# Patient Record
Sex: FEMALE | Race: WHITE | NOT HISPANIC OR LATINO
[De-identification: names, ages, dates, MRNs, and addresses within clinical notes are randomized per-mention and may not be internally consistent; named-entity substitution may affect disease eponyms.]

---

## 2019-03-18 ENCOUNTER — APPOINTMENT (OUTPATIENT)
Dept: ORTHOPEDIC SURGERY | Facility: CLINIC | Age: 54
End: 2019-03-18
Payer: COMMERCIAL

## 2019-03-18 VITALS
HEART RATE: 75 BPM | SYSTOLIC BLOOD PRESSURE: 126 MMHG | HEIGHT: 67 IN | WEIGHT: 123 LBS | BODY MASS INDEX: 19.3 KG/M2 | DIASTOLIC BLOOD PRESSURE: 81 MMHG

## 2019-03-18 DIAGNOSIS — Z78.9 OTHER SPECIFIED HEALTH STATUS: ICD-10-CM

## 2019-03-18 DIAGNOSIS — Z87.09 PERSONAL HISTORY OF OTHER DISEASES OF THE RESPIRATORY SYSTEM: ICD-10-CM

## 2019-03-18 DIAGNOSIS — Z86.79 PERSONAL HISTORY OF OTHER DISEASES OF THE CIRCULATORY SYSTEM: ICD-10-CM

## 2019-03-18 DIAGNOSIS — Z72.3 LACK OF PHYSICAL EXERCISE: ICD-10-CM

## 2019-03-18 PROBLEM — Z00.00 ENCOUNTER FOR PREVENTIVE HEALTH EXAMINATION: Status: ACTIVE | Noted: 2019-03-18

## 2019-03-18 PROCEDURE — 99204 OFFICE O/P NEW MOD 45 MIN: CPT

## 2019-03-18 PROCEDURE — 73630 X-RAY EXAM OF FOOT: CPT | Mod: TC,LT

## 2019-03-18 NOTE — DISCUSSION/SUMMARY
[de-identified] : Assessment: Status post bunion surgery with continued pain over/around the first MTP joint left foot.\par \par Plan:\par She has tried and failed conservative management and I would like to go ahead and get an MRI of the left foot evaluating the whole area. I do believe she might have sesamoiditis as the main culprit of her pain. I also want to evaluate the first metatarsal joint for any sclerosis and arthritis as seen on x-ray. Once the MRI is obtained we will bring her back to me with Dr. Harding to review and discuss treatment plan. For right now she will continue anti-inflammatory treatment and proper shoe wear.

## 2019-03-18 NOTE — HISTORY OF PRESENT ILLNESS
[FreeTextEntry1] : Sarmad is a new patient who is 53 years old and is coming in for a second opinion of her right foot. She had bunion surgery in October of 2018 by a podiatrist at [facility]. She started to have severe pain on the bottom part of her first metatarsal joint in the beginning of February and the pain has not subsided. She is wearing regular close toed shoes today and states that walking hurts. Her pain scale on a very bad day can be a 9/10 today at rest is 3/10. She denies numbness and tingling in the left foot.\par \par Over the past 6 weeks she has continued with physical therapy and a home exercise program. She should just finished a course yesterday of prednisone but prior to that she was on anti-inflammatories which did not help with her pain. She has failed at this point conservative management.

## 2019-03-18 NOTE — PHYSICAL EXAM
[de-identified] : Laterality: Left foot\par \par General: Alert and oriented x3.  In no acute distress.  Pleasant in nature with a normal affect.  No apparent respiratory distress. \par Erythema, Warmth, Rubor: Negative\par Swelling: Negative\par \par ROM Ankle:\par 1. Dorsiflexion: 10 degrees\par 2. Plantarflexion: 40 degrees\par 3. Inversion: 10 degrees\par 4. Eversion: 10 degrees\par \par ROM of digits: Normal\par \par Tenderness to Palpation: \par 1. Lateral Malleolus: Negative\par 2. Medial Malleolus: Negative\par 3. Heel Pain: Negative\par 4. LisFranc Joint Pain: Negative\par 5. First MTP Joint: Negative\par \par Tenderness Metatarsals:\par 1st MT: Negative\par 2nd MT: Negative\par 3rd MT: Negative\par 4th MT: Negative\par 5th MT: Negative\par Base of the 5th MT: Negative\par \par Tendon Pain:\par 1. Posterior Tibialis: Negative\par 2. Peroneus Brevis/Longus: Negative\par \par Ligament Pain:\par 1. ATFL/CFL/PTFL: Negative\par 2. Deltoid Ligaments: Negative\par \par Stability: \par 1. Anterior Drawer: Negative\par 2. Posterior Drawer: Negative\par \par Strength: \par 5/5 TA/GS/EHL/FHL/EDL/ADD/ABD\par \par Pulses: 2+ DP/PT Pulses\par \par Neuro: Intact motor and sensory throughout\par \par Additional Test:\par 1. Romano's Test: Negative\par 2. Tarsal Tunnel Tinel's Sign (Posterior Tibial Nerve Impingement): Negative\par 3. Single Heel Rise: Negative\par 4. severe pain when palpating the tibial sesamoid.\par \par *She also has minimal range of motion at the first MTP joint. Dorsiflexion to 10, flexion to approximately 25-30. [de-identified] : 3 views of the right foot show status post bunion surgery using a chevron technique with the first metatarsal head shortening.  2 screws noticed both proximal and distal to the first MTP joint which seemed to be good length. First metatarsal joint shows hallux rigidus and what seems to be a sclerotic white zone mid first metatarsal joint.

## 2019-03-21 ENCOUNTER — APPOINTMENT (OUTPATIENT)
Dept: ORTHOPEDIC SURGERY | Facility: CLINIC | Age: 54
End: 2019-03-21
Payer: COMMERCIAL

## 2019-03-21 PROCEDURE — 99214 OFFICE O/P EST MOD 30 MIN: CPT

## 2019-03-21 RX ORDER — MELOXICAM 7.5 MG/1
7.5 TABLET ORAL DAILY
Qty: 30 | Refills: 2 | Status: ACTIVE | COMMUNITY
Start: 2019-03-21 | End: 1900-01-01

## 2019-03-21 NOTE — HISTORY OF PRESENT ILLNESS
[FreeTextEntry1] : Patient is a 53 year female present in the office today in regards to her left foot pain. Her current pain level is a 10/10. She states that his pain and swelling remains the same compared to her last office visit. She feels as though her big toe and second toe are being "torn apart" and it has been difficult to weight bear. She presents to the office with results from his recent MRI. She presents to the office wearing sneakers with a metatarsal pad insert. Patient underwent Bunionectomy October 25, 2018, walked normally for 6 weeks and then her pain arose. She has taken prednisone with no improvement. No other complaints at this time.\par \par

## 2019-03-21 NOTE — ADDENDUM
[FreeTextEntry1] : Documented by Wenceslao Hernandez, acting as a scribe for Dr. Harding on this date 03/21/2019 \par \par All medical record entries made by the Scriber were at my, Dr. Harding's direction and personally dictated by me on 03/21/2019. I have reviewed the chart and agree that the record accurately reflects my personal performance of the history, physical exam, assessment and plan. I have also personally directed, reviewed, and agreed with the chart.

## 2019-03-21 NOTE — PHYSICAL EXAM
[de-identified] : General: Alert and oriented x3. In no acute distress. Pleasant in nature with a normal affect. No apparent respiratory distress.\par \par Left Foot exam\par Skin: Clean, dry, intact\par Inspection: No obvious malalignment, no masses, no swelling, no effusion. Hallux valgus. Dorsal medial incision.\par Pulses: 2+ DP/PT pulses\par ROM: FOOT MTP dorsi 40, limited plantar ROM of digits ANKLE 10  degrees of dorsiflexion, 40 degrees of plantarflexion, 10  degrees of subtalar motion.\par Painful ROM: None\par Tenderness: + tenderness fibular sesamoid. + tenderness 1st webspace. No tenderness over the medial/lateral malleolus, no CFL/ATFL/PTFL pain. No medial malleolus pain, no deltoid ligament pain. No heel pain. No Achilles tenderness. No 5th metatarsal pain. No pain to the LisFranc joint. No ttp over the posterior tibial tendon.\par Stability: Negative anterior/posterior drawer.\par Strength: 5/5 ADD/ABD/TA/GS/EHL/FHL/EDL\par Neuro: Sensation in tact to light touch throughout\par Additional tests: Negative Mortons test, negative tarsal tunnel tinels, negative single heel rise\par \par \par   [de-identified] : MRI of left foot performed on 3/18/19 revealed Postoperative changes along the first ray of the left foot. No postoperative complication visualized. Mild stress reaction in the head of the third metatarsal. Mild medial hallux sesamoiditis.

## 2019-03-21 NOTE — DISCUSSION/SUMMARY
[de-identified] : Today in the office I had a lengthy discussion with the patient regarding her left foot pain. I have addressed all of the patient's concern surrounding the pathology of their conditions. I advised the patient to utilize ice and elevate her left foot above the level of their heart. Today, I provided the patient with an in depth interpretation of left foot MRI, and answered all questions.  I have fitted the patient with a short CAM boot today that she should wear for a month. Patient has been prescribe 7.5 mg Meloxicam to be taken with food and no other NSAID. Should there be no improvement, she may take the Mobic BID. The patient can weight bear as tolerated. We discussed the possibility of removing her hardware or sesamoid to alleviate her pain, however would not do any surgery until she is one year post op. The patient should contact the office immediately should they their pain worsen. Follow up with the office in 1 month.  I received and answered all the patient's questions and they verbalized an understanding. The patient is in agreement with this treatment plan. \par \par

## 2019-04-09 ENCOUNTER — APPOINTMENT (OUTPATIENT)
Dept: ORTHOPEDIC SURGERY | Facility: CLINIC | Age: 54
End: 2019-04-09
Payer: COMMERCIAL

## 2019-04-09 DIAGNOSIS — M79.662 PAIN IN LEFT LOWER LEG: ICD-10-CM

## 2019-04-09 PROCEDURE — 99213 OFFICE O/P EST LOW 20 MIN: CPT

## 2019-04-09 NOTE — HISTORY OF PRESENT ILLNESS
[FreeTextEntry1] : Patient is a 53 year female present in the office today in regards to her left foot pain. Her current pain level is a 2/10. She states that her pain has improved 75% and swelling worse compared to her last office visit. Patient complains of swelling in her calf, which is exacerbated as the day continues. She is ambulating with a CAM boot. She is not currently taking any pain medications at this moment. No other complaints at this time.\par \par

## 2019-04-09 NOTE — PHYSICAL EXAM
[de-identified] : General: Alert and oriented x3. In no acute distress. Pleasant in nature with a normal affect. No apparent respiratory distress.\par \par Left Foot exam\par Skin: Clean, dry, intact\par Inspection: No obvious malalignment, no masses, + calf swelling swelling, no effusion. \par Pulses: 2+ DP/PT pulses\par ROM: FOOT full ROM of digits ANKLE 10 degrees of dorsiflexion, 40 degrees of plantarflexion, 10 degrees of subtalar motion.\par Painful ROM: None\par Tenderness: +tenderness over sesamoids. No tenderness over the medial/lateral malleolus, no CFL/ATFL/PTFL pain. No medial malleolus pain, no deltoid ligament pain. No heel pain. No Achilles tenderness. No 5th metatarsal pain. No pain to the LisFranc joint. No ttp over the posterior tibial tendon.\par Stability: Negative anterior/posterior drawer.\par Strength: 5/5 ADD/ABD/TA/GS/EHL/FHL/EDL\par Neuro: Sensation in tact to light touch throughout\par Additional tests: Negative Mortons test, negative tarsal tunnel tinels, negative single heel rise\par \par \par   [de-identified] : No new imaging.

## 2019-04-09 NOTE — DISCUSSION/SUMMARY
[de-identified] : Today in the office I had a lengthy discussion with the patient regarding her left foot pain. I have addressed all of the patient's concern surrounding the pathology of their conditions. I advised the patient to utilize ice, NSAIDs PRN, and elevate her left lower extremity above the level of their heart. In lieu of the swelling that the patient is experiencing in her left calf, at this time I would like the patient to move forward with a Doppler to rule out a blood clot. The patient will follow up with the office in 1 week. In the interim, she should continue to ambulate with the CAM boot. After, she will slowly begin to ween out of the boot and into a stiff soled shoe. The patient should contact the office immediately should they their pain worsen. I received and answered all the patient's questions and they verbalized an understanding. The patient is in agreement with this treatment plan. \par \par

## 2019-04-09 NOTE — ADDENDUM
[FreeTextEntry1] : Documented by Wenceslao Hernandez, acting as a scribe for Dr. Harding on this date 04/09/2019 \par \par All medical record entries made by the Scriber were at my, Dr. Harding's direction and personally dictated by me on 04/09/2019. I have reviewed the chart and agree that the record accurately reflects my personal performance of the history, physical exam, assessment and plan. I have also personally directed, reviewed, and agreed with the chart.

## 2019-04-29 ENCOUNTER — APPOINTMENT (OUTPATIENT)
Dept: ORTHOPEDIC SURGERY | Facility: CLINIC | Age: 54
End: 2019-04-29
Payer: COMMERCIAL

## 2019-04-29 DIAGNOSIS — M84.375D STRESS FRACTURE, LEFT FOOT, SUBSEQUENT ENCOUNTER FOR FRACTURE WITH ROUTINE HEALING: ICD-10-CM

## 2019-04-29 DIAGNOSIS — M84.374A STRESS FRACTURE, RIGHT FOOT, INITIAL ENCOUNTER FOR FRACTURE: ICD-10-CM

## 2019-04-29 PROCEDURE — 99213 OFFICE O/P EST LOW 20 MIN: CPT

## 2019-04-29 NOTE — DISCUSSION/SUMMARY
[de-identified] : Today I had a lengthy discussion with the patient regarding their left foot pain.I have addressed all the patient's concerns surrounding the pathology of their condition. A discussion was had about the possible use of a Romano's plate. The patient was shown the plate in the office today. I advised the patient to utilize ice, NSAIDs PRN, and heat. They can also elevate their left foot above the level of their heart. I would like to see the patient back in the office in 6-8 weeks to reassess their condition. The patient understood and verbally agreed to the treatment plan. All of their questions were answered and they were satisfied with the visit. The patient should call the office if they have any questions or experience worsening symptoms.

## 2019-04-29 NOTE — PHYSICAL EXAM
[de-identified] : General: Alert and oriented x3. In no acute distress. Pleasant in nature with a normal affect. No apparent respiratory distress.\par \par Left Foot exam\par Skin: Clean, dry, intact\par Inspection: No obvious malalignment, no masses, + calf swelling swelling, no effusion. \par Pulses: 2+ DP/PT pulses\par ROM: FOOT full ROM of digits ANKLE 10 degrees of dorsiflexion, 40 degrees of plantarflexion, 10 degrees of subtalar motion.\par Painful ROM: None\par Tenderness: Improved tenderness over sesamoids. No tenderness over the medial/lateral malleolus, no CFL/ATFL/PTFL pain. No medial malleolus pain, no deltoid ligament pain. No heel pain. No Achilles tenderness. No 5th metatarsal pain. No pain to the LisFranc joint. No ttp over the posterior tibial tendon.\par Stability: Negative anterior/posterior drawer.\par Strength: 5/5 ADD/ABD/TA/GS/EHL/FHL/EDL\par Neuro: Sensation in tact to light touch throughout\par Additional tests: Negative Mortons test, negative tarsal tunnel tinels, negative single heel rise\par \par \par   [de-identified] : Doppler Venus of LLE from outside facility on 4/19/2019 and reviewed by me today, 4/29/19, revealed: \par \par Normal study.

## 2019-04-29 NOTE — HISTORY OF PRESENT ILLNESS
[FreeTextEntry1] : 53 year year old female presenting with left foot pain. The patient’s pain is noted to be a 2/10. The patient describes their pain and swelling as the same compared to their last visit. She describes her swelling as improved compared to the last visit. The patient states that she has been non weight bearing. She presents wearing a PO shoe. The patient reports that they are currently not attending physical therapy. She is currently taking no pain medication. No other complaints at this time.

## 2019-04-29 NOTE — ADDENDUM
[FreeTextEntry1] : I, Mo Farr, acted solely as a scribe for Dr. Casey Harding on this date 04/29/2019  .\par  \par All medical record entries made by the Scribe were at my, Dr. Casey Harding, direction and personally dictated by me on 04/29/2019 . I have reviewed the chart and agree that the record accurately reflects my personal performance of the history, physical exam, assessment and plan. I have also personally directed, reviewed, and agreed with the chart.\par \par \par

## 2019-05-10 ENCOUNTER — APPOINTMENT (OUTPATIENT)
Dept: ORTHOPEDIC SURGERY | Facility: CLINIC | Age: 54
End: 2019-05-10
Payer: COMMERCIAL

## 2019-05-10 PROCEDURE — 99214 OFFICE O/P EST MOD 30 MIN: CPT

## 2019-05-10 PROCEDURE — 73630 X-RAY EXAM OF FOOT: CPT | Mod: LT

## 2019-05-10 NOTE — HISTORY OF PRESENT ILLNESS
[FreeTextEntry1] : 53 year year old female presenting with left foot pain. The patient’s pain is noted to be a 6/10. The patient describes their pain as the same compared to their last visit. She describes her swelling as improved compared to the last visit. The patient states that she recently experienced an increase in pain that was described as sharp in nature. The patient states that her pain occurs between the toes while walking and near the site of her surgical hardware. She is currently taking no pain medication. The patient reports that they are currently not attending physical therapy. She presents wearing regular shoes. No other complaints at this time.

## 2019-05-10 NOTE — PHYSICAL EXAM
[de-identified] : General: Alert and oriented x3. In no acute distress. Pleasant in nature with a normal affect. No apparent respiratory distress.\par \par Left Foot exam\par Skin: Clean, dry, intact\par Inspection: No obvious malalignment, no masses, improved calf swelling swelling, no effusion. \par Pulses: 2+ DP/PT pulses\par ROM: FOOT full ROM of digits ANKLE 10 degrees of dorsiflexion, 40 degrees of plantarflexion, 10 degrees of subtalar motion.\par Painful ROM: None\par Tenderness: Improved tenderness over sesamoids. No tenderness over the medial/lateral malleolus, no CFL/ATFL/PTFL pain. No medial malleolus pain, no deltoid ligament pain. No heel pain. No Achilles tenderness. No 5th metatarsal pain. No pain to the LisFranc joint. No ttp over the posterior tibial tendon.\par Stability: Negative anterior/posterior drawer.\par Strength: 5/5 ADD/ABD/TA/GS/EHL/FHL/EDL\par Neuro: Sensation in tact to light touch throughout\par Additional tests: Negative Mortons test, negative tarsal tunnel tinels, negative single heel rise\par \par \par   [de-identified] : 3V of the L foot were ordered obtained and reviewed by me today, 05/10/2019 , revealed: Hardware in place. \par \par \par \par

## 2019-05-10 NOTE — DISCUSSION/SUMMARY
[de-identified] : Today I had a lengthy discussion with the patient regarding their left foot pain.I have addressed all the patient's concerns surrounding the pathology of their condition.  A discussion was had about surgery. A lengthy discussion was had about hardware removal surgery for the left foot . All risks, benefits and alternatives to the recommended surgical procedure were discussed which include but are not limited to bleeding, infection, nerve damage, vascular damage, failure of the wound to heal, the need for further surgery, loss of limb, DVT, PE, loss of life as well as the risks associated with general anesthesia. The patient verbalized understanding and provided informed consent to move forward with surgery. I advised the patient to obtain the OP report for the original surgery prior to the surgery. I informed the patient that she would have to utilize a stiff shoe following the surgery. The patient understood and verbally agreed to the treatment plan. All of their questions were answered and they were satisfied with the visit. The patient should call the office if they have any questions or experience worsening symptoms.

## 2019-05-10 NOTE — ADDENDUM
[FreeTextEntry1] : I, Mo Farr, acted solely as a scribe for Dr. Casey Harding on this date 05/10/2019  .\par  \par All medical record entries made by the Scribe were at my, Dr. Casey Harding, direction and personally dictated by me on 05/10/2019 . I have reviewed the chart and agree that the record accurately reflects my personal performance of the history, physical exam, assessment and plan. I have also personally directed, reviewed, and agreed with the chart.\par \par \par

## 2019-05-17 ENCOUNTER — RX RENEWAL (OUTPATIENT)
Age: 54
End: 2019-05-17

## 2019-05-25 ENCOUNTER — TRANSCRIPTION ENCOUNTER (OUTPATIENT)
Age: 54
End: 2019-05-25

## 2019-05-29 ENCOUNTER — APPOINTMENT (OUTPATIENT)
Dept: ORTHOPEDIC SURGERY | Facility: HOSPITAL | Age: 54
End: 2019-05-29

## 2019-05-29 ENCOUNTER — RESULT REVIEW (OUTPATIENT)
Age: 54
End: 2019-05-29

## 2019-05-29 ENCOUNTER — OUTPATIENT (OUTPATIENT)
Dept: OUTPATIENT SERVICES | Facility: HOSPITAL | Age: 54
LOS: 1 days | Discharge: HOME | End: 2019-05-29
Payer: COMMERCIAL

## 2019-05-29 VITALS
DIASTOLIC BLOOD PRESSURE: 79 MMHG | RESPIRATION RATE: 16 BRPM | OXYGEN SATURATION: 99 % | TEMPERATURE: 98 F | HEART RATE: 83 BPM | SYSTOLIC BLOOD PRESSURE: 126 MMHG

## 2019-05-29 VITALS
DIASTOLIC BLOOD PRESSURE: 76 MMHG | OXYGEN SATURATION: 99 % | RESPIRATION RATE: 16 BRPM | HEART RATE: 93 BPM | SYSTOLIC BLOOD PRESSURE: 113 MMHG | TEMPERATURE: 98 F | WEIGHT: 125 LBS | HEIGHT: 67 IN

## 2019-05-29 DIAGNOSIS — Z98.890 OTHER SPECIFIED POSTPROCEDURAL STATES: Chronic | ICD-10-CM

## 2019-05-29 LAB
HCG UR QL: NEGATIVE — SIGNIFICANT CHANGE UP
SURGICAL PATHOLOGY STUDY: SIGNIFICANT CHANGE UP

## 2019-05-29 PROCEDURE — 88300 SURGICAL PATH GROSS: CPT | Mod: 26

## 2019-05-29 PROCEDURE — 20680 REMOVAL OF IMPLANT DEEP: CPT | Mod: LT

## 2019-05-29 RX ORDER — FAMOTIDINE 10 MG/ML
20 INJECTION INTRAVENOUS ONCE
Refills: 0 | Status: COMPLETED | OUTPATIENT
Start: 2019-05-29 | End: 2019-05-29

## 2019-05-29 RX ORDER — ROSUVASTATIN CALCIUM 5 MG/1
1 TABLET ORAL
Qty: 0 | Refills: 0 | DISCHARGE

## 2019-05-29 RX ORDER — OXYCODONE HYDROCHLORIDE 5 MG/1
1 TABLET ORAL
Qty: 5 | Refills: 0
Start: 2019-05-29

## 2019-05-29 RX ORDER — LORATADINE 10 MG/1
1 TABLET ORAL
Qty: 0 | Refills: 0 | DISCHARGE

## 2019-05-29 RX ORDER — ACETAMINOPHEN 500 MG
975 TABLET ORAL ONCE
Refills: 0 | Status: COMPLETED | OUTPATIENT
Start: 2019-05-29 | End: 2019-05-29

## 2019-05-29 RX ORDER — FENTANYL CITRATE 50 UG/ML
50 INJECTION INTRAVENOUS
Refills: 0 | Status: DISCONTINUED | OUTPATIENT
Start: 2019-05-29 | End: 2019-05-29

## 2019-05-29 RX ORDER — ONDANSETRON 8 MG/1
4 TABLET, FILM COATED ORAL ONCE
Refills: 0 | Status: DISCONTINUED | OUTPATIENT
Start: 2019-05-29 | End: 2019-05-29

## 2019-05-29 RX ORDER — SODIUM CHLORIDE 9 MG/ML
3 INJECTION INTRAMUSCULAR; INTRAVENOUS; SUBCUTANEOUS EVERY 8 HOURS
Refills: 0 | Status: DISCONTINUED | OUTPATIENT
Start: 2019-05-29 | End: 2019-05-29

## 2019-05-29 RX ORDER — DOCUSATE SODIUM 100 MG
1 CAPSULE ORAL
Qty: 20 | Refills: 0
Start: 2019-05-29

## 2019-05-29 RX ORDER — ASPIRIN/CALCIUM CARB/MAGNESIUM 324 MG
1 TABLET ORAL
Qty: 14 | Refills: 0
Start: 2019-05-29 | End: 2019-06-11

## 2019-05-29 RX ORDER — OXYCODONE HYDROCHLORIDE 5 MG/1
5 TABLET ORAL ONCE
Refills: 0 | Status: DISCONTINUED | OUTPATIENT
Start: 2019-05-29 | End: 2019-05-29

## 2019-05-29 RX ORDER — SODIUM CHLORIDE 9 MG/ML
1000 INJECTION INTRAMUSCULAR; INTRAVENOUS; SUBCUTANEOUS
Refills: 0 | Status: DISCONTINUED | OUTPATIENT
Start: 2019-05-29 | End: 2019-05-29

## 2019-05-29 RX ORDER — OXYCODONE HYDROCHLORIDE 5 MG/1
10 TABLET ORAL ONCE
Refills: 0 | Status: DISCONTINUED | OUTPATIENT
Start: 2019-05-29 | End: 2019-05-29

## 2019-05-29 RX ORDER — ONDANSETRON 8 MG/1
1 TABLET, FILM COATED ORAL
Qty: 20 | Refills: 0
Start: 2019-05-29

## 2019-05-29 RX ORDER — OLMESARTAN MEDOXOMIL 5 MG/1
1 TABLET, FILM COATED ORAL
Qty: 0 | Refills: 0 | DISCHARGE

## 2019-05-29 RX ADMIN — SODIUM CHLORIDE 100 MILLILITER(S): 9 INJECTION INTRAMUSCULAR; INTRAVENOUS; SUBCUTANEOUS at 10:30

## 2019-05-29 RX ADMIN — OXYCODONE HYDROCHLORIDE 10 MILLIGRAM(S): 5 TABLET ORAL at 06:43

## 2019-05-29 RX ADMIN — Medication 975 MILLIGRAM(S): at 06:44

## 2019-05-29 RX ADMIN — FAMOTIDINE 20 MILLIGRAM(S): 10 INJECTION INTRAVENOUS at 06:43

## 2019-05-29 RX ADMIN — OXYCODONE HYDROCHLORIDE 10 MILLIGRAM(S): 5 TABLET ORAL at 06:44

## 2019-05-29 NOTE — ASU DISCHARGE PLAN (ADULT/PEDIATRIC) - CARE PROVIDER_API CALL
Casey Harding (DO)  Orthopaedic Surgery  [address] [place] [address]  Phone: [phone]  Fax: [phone]  Follow Up Time:

## 2019-06-02 DIAGNOSIS — T84.84XA PAIN DUE TO INTERNAL ORTHOPEDIC PROSTHETIC DEVICES, IMPLANTS AND GRAFTS, INITIAL ENCOUNTER: ICD-10-CM
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
## 2019-06-02 DIAGNOSIS — I10 ESSENTIAL (PRIMARY) HYPERTENSION: ICD-10-CM

## 2019-06-02 DIAGNOSIS — E78.5 HYPERLIPIDEMIA, UNSPECIFIED: ICD-10-CM

## 2019-06-02 DIAGNOSIS — J45.909 UNSPECIFIED ASTHMA, UNCOMPLICATED: ICD-10-CM

## 2019-06-05 ENCOUNTER — APPOINTMENT (OUTPATIENT)
Dept: ORTHOPEDIC SURGERY | Facility: CLINIC | Age: 54
End: 2019-06-05
Payer: COMMERCIAL

## 2019-06-05 PROBLEM — E78.00 PURE HYPERCHOLESTEROLEMIA, UNSPECIFIED: Chronic | Status: ACTIVE | Noted: 2019-05-29

## 2019-06-05 PROBLEM — I10 ESSENTIAL (PRIMARY) HYPERTENSION: Chronic | Status: ACTIVE | Noted: 2019-05-29

## 2019-06-05 PROCEDURE — 73630 X-RAY EXAM OF FOOT: CPT | Mod: LT

## 2019-06-05 PROCEDURE — 99024 POSTOP FOLLOW-UP VISIT: CPT
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
## 2019-06-05 NOTE — HISTORY OF PRESENT ILLNESS
[FreeTextEntry1] : Pt is a 53 year old  F present in the office today in regards to her L foot pain. Her current pain level is a 1/10. Pt reports she is s/p 1 week PO L foot sx from outside facility. Pt states she has improved in pain and swelling as compared to their last visit. Pt is present at the appointment wearing a PO shoe. She is not currently taking any pain medications at this moment. No other complaints at this time.

## 2019-06-05 NOTE — ADDENDUM
[FreeTextEntry1] : Documented by Sharri Pastor acting as a scribe for Dr. Harding on 06/05/2019 \par \par All medical record entries made by the Scribe were at my, Dr. Mendenhall, direction and\par personally dictated by me on 06/05/2019 . I have reviewed the chart and agree that the record\par accurately reflects my personal performance of the history, physical exam, procedure and imaging.

## 2019-06-05 NOTE — DISCUSSION/SUMMARY
[de-identified] : Today in the office I had a lengthy discussion with the patient regarding her left foot pain. I have addressed all of the patient's concern surrounding the pathology of their conditions. Pt is doing well PO. She is to be out of work until 6/10/19. The pt is to call me as soon as possible if they notice any worsening pain or symptoms. I would like to follow up with the patient in 1 week for suture removal. All questions were answered and the patient verbalized understanding. The patient is in agreement with this treatment plan. \par \par Pt requesting implants removed.

## 2019-06-12 ENCOUNTER — APPOINTMENT (OUTPATIENT)
Dept: ORTHOPEDIC SURGERY | Facility: CLINIC | Age: 54
End: 2019-06-12
Payer: COMMERCIAL

## 2019-06-12 PROCEDURE — 99024 POSTOP FOLLOW-UP VISIT: CPT

## 2019-06-12 NOTE — HISTORY OF PRESENT ILLNESS
[___ Weeks Post Op] : [unfilled] weeks post op [2] : the patient reports pain that is 2/10 in severity [Excellent Pain Control] : has excellent pain control [Doing Well] : is doing well [Sutures Removed] : sutures were removed [No Sign of Infection] : is showing no signs of infection [Steri-Strips Removed & Replaced] : steri-strips removed and replaced [Nausea] : no nausea [Chills] : no chills [Fever] : no fever [Vomiting] : no vomiting [de-identified] : Removal of hardware left foot. DOS 5/29/19 [de-identified] : None new obtained today.  [de-identified] : 53 year old female present in the office s/p Removal of hardware left foot. DOS 5/29/19. Her current pain level is a 2/10. Pt states their pain and swelling is unchanged as compared to the previous visit. She is not currently taking any pain medications at this moment. Pt is present at the appointment wearing a PO shoe. No other complaints at this time. \par  \par  [de-identified] :  General: Alert and oriented x3. In no acute distress. Pleasant in nature with a normal affect. No apparent respiratory distress.\par \par L foot Exam \par The wound is intact. Sutures removed and steri strips applied. Pain improved. No evidence of any diastases or dehiscence. No surrounding erythema noted. No fluctuance. The area is warm and well-perfused. The patient is able to wiggle her toes. Neurovascularly intact. No lymphadenopathy. \par  [de-identified] : 53 year old female present in the office s/p Removal of hardware left foot. DOS 5/29/19. I have addressed all of the patient's concern surrounding the pathology of their conditions. I have removed all the patient's sutures today and had applied steri strips in the office. Pt tolerated the procedure well. I also suggested to the pt that they keep the wound dry and clean. Pt is allowed to transition out of the PO shoe and into a regular sneaker. The pt is to call me as soon as possible if they notice any worsening pain or symptoms. I would like to follow up with the patient within the next 8 weeks. All questions were answered and the patient verbalized understanding. The patient is in agreement with this treatment plan. \par  \par

## 2019-06-12 NOTE — HISTORY OF PRESENT ILLNESS
[___ Weeks Post Op] : [unfilled] weeks post op [2] : the patient reports pain that is 2/10 in severity [Doing Well] : is doing well [Excellent Pain Control] : has excellent pain control [Sutures Removed] : sutures were removed [No Sign of Infection] : is showing no signs of infection [Steri-Strips Removed & Replaced] : steri-strips removed and replaced [Chills] : no chills [Nausea] : no nausea [Fever] : no fever [de-identified] : Removal of hardware left foot. DOS 5/29/19 [Vomiting] : no vomiting [de-identified] :  General: Alert and oriented x3. In no acute distress. Pleasant in nature with a normal affect. No apparent respiratory distress.\par \par L foot Exam \par The wound is intact. Sutures removed and steri strips applied. Pain improved. No evidence of any diastases or dehiscence. No surrounding erythema noted. No fluctuance. The area is warm and well-perfused. The patient is able to wiggle her toes. Neurovascularly intact. No lymphadenopathy. \par  [de-identified] : 53 year old female present in the office s/p Removal of hardware left foot. DOS 5/29/19. Her current pain level is a 2/10. Pt states their pain and swelling is unchanged as compared to the previous visit. She is not currently taking any pain medications at this moment. Pt is present at the appointment wearing a PO shoe. No other complaints at this time. \par  \par  [de-identified] : None new obtained today.  [de-identified] : 53 year old female present in the office s/p Removal of hardware left foot. DOS 5/29/19. I have addressed all of the patient's concern surrounding the pathology of their conditions. I have removed all the patient's sutures today and had applied steri strips in the office. Pt tolerated the procedure well. I also suggested to the pt that they keep the wound dry and clean. Pt is allowed to transition out of the PO shoe and into a regular sneaker. The pt is to call me as soon as possible if they notice any worsening pain or symptoms. I would like to follow up with the patient within the next 8 weeks. All questions were answered and the patient verbalized understanding. The patient is in agreement with this treatment plan. \par  \par

## 2019-06-12 NOTE — ADDENDUM
[FreeTextEntry1] : Documented by Sharri Pastor acting as a scribe for Dr. Harding on 06/12/2019 \par \par All medical record entries made by the Scribe were at my, Dr. Mendenhall, direction and\par personally dictated by me on 06/12/2019 . I have reviewed the chart and agree that the record\par accurately reflects my personal performance of the history, physical exam, procedure and imaging.

## 2019-07-08 ENCOUNTER — RX RENEWAL (OUTPATIENT)
Age: 54
End: 2019-07-08

## 2019-07-08 RX ORDER — CHOLECALCIFEROL (VITAMIN D3) 1250 MCG
1.25 MG CAPSULE ORAL
Qty: 6 | Refills: 1 | Status: ACTIVE | COMMUNITY
Start: 2019-04-09 | End: 1900-01-01

## 2019-07-21 ENCOUNTER — TRANSCRIPTION ENCOUNTER (OUTPATIENT)
Age: 54
End: 2019-07-21

## 2019-08-13 ENCOUNTER — APPOINTMENT (OUTPATIENT)
Dept: ORTHOPEDIC SURGERY | Facility: CLINIC | Age: 54
End: 2019-08-13

## 2019-08-22 ENCOUNTER — APPOINTMENT (OUTPATIENT)
Dept: ORTHOPEDIC SURGERY | Facility: CLINIC | Age: 54
End: 2019-08-22
Payer: COMMERCIAL

## 2019-08-22 DIAGNOSIS — M25.80 OTHER SPECIFIED JOINT DISORDERS, UNSPECIFIED JOINT: ICD-10-CM

## 2019-08-22 DIAGNOSIS — T84.9XXA UNSPECIFIED COMPLICATION OF INTERNAL ORTHOPEDIC PROSTHETIC DEVICE, IMPLANT AND GRAFT, INITIAL ENCOUNTER: ICD-10-CM

## 2019-08-22 DIAGNOSIS — M79.672 PAIN IN LEFT FOOT: ICD-10-CM

## 2019-08-22 PROCEDURE — 99024 POSTOP FOLLOW-UP VISIT: CPT

## 2019-08-22 NOTE — ADDENDUM
[FreeTextEntry1] : Documented by Yamilka Salmeron acting solely as a scribe for Dr. Casey Harding on this date 08/22/2019 .\par \par All medical record entries made by the Scribe were at my, Dr. Casey Harding, direction and personally dictated by me on 08/22/2019 . I have reviewed the chart and agree that the record accurately reflects my personal performance of the history, physical exam, assessment and plan. I have also personally directed, reviewed, and agreed with the chart.\par \par \par

## 2019-08-22 NOTE — HISTORY OF PRESENT ILLNESS
[___ Months Post Op] : [unfilled] months post op [0] : no pain reported [Doing Well] : is doing well [Excellent Pain Control] : has excellent pain control [No Sign of Infection] : is showing no signs of infection [Sutures Removed] : sutures were removed [Steri-Strips Removed & Replaced] : steri-strips removed and replaced [Healed] : healed [Chills] : no chills [Fever] : no fever [Nausea] : no nausea [Vomiting] : no vomiting [Discharge] : absent of discharge [Erythema] : not erythematous [Dehiscence] : not dehisced [de-identified] : s/p removal of hardware left foot. DOS 5/29/19 [de-identified] : 53 year old female present in the office s/p Removal of hardware left foot. DOS 5/29/19. Her current pain level is a 0/10. Pt states their pain is 100% improved and their swelling is 50% improved as compared to the previous visit. She is not currently taking any pain medications at this moment. She is currently not in physical therapy. Pt is present at the appointment wearing a regular shoes. No other complaints at this time. \par  \par  [de-identified] : General: Alert and oriented x3. In no acute distress. Pleasant in nature with a normal affect. No apparent respiratory distress.\par The wound is intact. no evidence of any diastases or dehiscence. No surrounding erythema noted. No fluctuance. The area is warm and well perfused. The patient is able to wiggle their toes. Neurovascularly intact. She has complete resolution. She has dorsiflexion to 40° and limited planar flexion. Hallux valgus interphalangeus  \par  [de-identified] : No new imaging reviewed today. \par \par   [de-identified] : 53 year old female present in the office s/p removal of hardware left foot. DOS 5/29/19. She has complete resolution of her symptoms. Patient is a 53 year old female present in the office today 3 months s/p removal of hardware left foot.  The patient understood and verbally agreed to the treatment plan. All of their questions were answered and they were satisfied with the visit. The patient should call the office if they have any question or experience worsening symptoms.

## 2020-11-19 ENCOUNTER — TRANSCRIPTION ENCOUNTER (OUTPATIENT)
Age: 55
End: 2020-11-19

## 2021-01-02 ENCOUNTER — TRANSCRIPTION ENCOUNTER (OUTPATIENT)
Age: 56
End: 2021-01-02

## 2021-01-24 ENCOUNTER — TRANSCRIPTION ENCOUNTER (OUTPATIENT)
Age: 56
End: 2021-01-24

## 2022-01-02 ENCOUNTER — TRANSCRIPTION ENCOUNTER (OUTPATIENT)
Age: 57
End: 2022-01-02

## 2022-05-22 ENCOUNTER — NON-APPOINTMENT (OUTPATIENT)
Age: 57
End: 2022-05-22

## 2023-02-23 ENCOUNTER — OFFICE (OUTPATIENT)
Dept: URBAN - METROPOLITAN AREA CLINIC 102 | Facility: CLINIC | Age: 58
Setting detail: OPHTHALMOLOGY
End: 2023-02-23
Payer: COMMERCIAL

## 2023-02-23 DIAGNOSIS — H40.033: ICD-10-CM

## 2023-02-23 DIAGNOSIS — G51.4: ICD-10-CM

## 2023-02-23 DIAGNOSIS — D31.31: ICD-10-CM

## 2023-02-23 PROCEDURE — 92014 COMPRE OPH EXAM EST PT 1/>: CPT | Performed by: OPHTHALMOLOGY

## 2023-02-23 PROCEDURE — 92020 GONIOSCOPY: CPT | Performed by: OPHTHALMOLOGY

## 2023-02-23 ASSESSMENT — REFRACTION_MANIFEST
OD_CYLINDER: -0.50
OS_SPHERE: +2.00
OD_CYLINDER: -0.50
OS_VA1: 20/20
OS_SPHERE: +1.25
OS_ADD: +2.00
OD_ADD: +2.00
OD_SPHERE: +2.00
OD_ADD: +2.25
OD_VA1: 20/20
OS_CYLINDER: -0.25
OD_AXIS: 160
OD_AXIS: 145
OS_VA1: 20/20
OS_AXIS: 176
OD_AXIS: 148
OD_VA1: 20/20
OS_CYLINDER: SPHERE
OS_ADD: +2.25
OD_SPHERE: +1.25
OS_CYLINDER: SPHERE
OS_SPHERE: +1.25
OD_SPHERE: +1.50
OD_CYLINDER: -0.50

## 2023-02-23 ASSESSMENT — REFRACTION_CURRENTRX
OS_CYLINDER: SPHERE
OD_ADD: +1.75
OS_SPHERE: +0.75
OS_ADD: +1.75
OD_OVR_VA: 20/
OD_AXIS: 147
OD_SPHERE: +1.25
OD_CYLINDER: -0.50
OS_OVR_VA: 20/

## 2023-02-23 ASSESSMENT — KERATOMETRY
OD_K1POWER_DIOPTERS: 45.25
OS_AXISANGLE_DEGREES: 81
OS_K1POWER_DIOPTERS: 45.25
OD_K2POWER_DIOPTERS: 45.75
OS_K2POWER_DIOPTERS: 45.75
OD_AXISANGLE_DEGREES: 89

## 2023-02-23 ASSESSMENT — REFRACTION_AUTOREFRACTION
OS_AXIS: 45
OS_SPHERE: +2.00
OD_SPHERE: +2.25
OD_AXIS: 143
OD_CYLINDER: -0.50
OS_CYLINDER: -0.25

## 2023-02-23 ASSESSMENT — AXIALLENGTH_DERIVED
OS_AL: 22.2124
OS_AL: 22.2124
OD_AL: 22.1694
OD_AL: 22.4304
OD_AL: 22.2557
OD_AL: 22.5188

## 2023-02-23 ASSESSMENT — CONFRONTATIONAL VISUAL FIELD TEST (CVF)
OD_FINDINGS: FULL
OS_FINDINGS: FULL

## 2023-02-23 ASSESSMENT — SPHEQUIV_DERIVED
OS_SPHEQUIV: 1.875
OS_SPHEQUIV: 1.875
OD_SPHEQUIV: 1.75
OD_SPHEQUIV: 1.25
OD_SPHEQUIV: 2
OD_SPHEQUIV: 1

## 2023-02-23 ASSESSMENT — VISUAL ACUITY
OS_BCVA: 20/20
OD_BCVA: 20/20

## 2023-04-19 ENCOUNTER — NON-APPOINTMENT (OUTPATIENT)
Age: 58
End: 2023-04-19

## 2023-05-02 ENCOUNTER — NON-APPOINTMENT (OUTPATIENT)
Age: 58
End: 2023-05-02

## 2023-10-10 ENCOUNTER — APPOINTMENT (OUTPATIENT)
Dept: ORTHOPEDIC SURGERY | Facility: CLINIC | Age: 58
End: 2023-10-10
Payer: COMMERCIAL

## 2023-10-10 DIAGNOSIS — Z86.39 PERSONAL HISTORY OF OTHER ENDOCRINE, NUTRITIONAL AND METABOLIC DISEASE: ICD-10-CM

## 2023-10-10 DIAGNOSIS — Z80.9 FAMILY HISTORY OF MALIGNANT NEOPLASM, UNSPECIFIED: ICD-10-CM

## 2023-10-10 DIAGNOSIS — M79.645 PAIN IN LEFT FINGER(S): ICD-10-CM

## 2023-10-10 DIAGNOSIS — Z83.3 FAMILY HISTORY OF DIABETES MELLITUS: ICD-10-CM

## 2023-10-10 PROCEDURE — 20600 DRAIN/INJ JOINT/BURSA W/O US: CPT | Mod: LT

## 2023-10-10 PROCEDURE — 73130 X-RAY EXAM OF HAND: CPT | Mod: 50

## 2023-10-10 PROCEDURE — 99204 OFFICE O/P NEW MOD 45 MIN: CPT | Mod: 25

## 2023-10-10 RX ORDER — ROSUVASTATIN CALCIUM 5 MG/1
TABLET, FILM COATED ORAL
Refills: 0 | Status: ACTIVE | COMMUNITY

## 2023-10-10 RX ORDER — OLMESARTAN MEDOXOMIL-HYDROCHLOROTHIAZIDE 12.5; 4 MG/1; MG/1
40-12.5 TABLET, FILM COATED ORAL
Refills: 0 | Status: ACTIVE | COMMUNITY

## 2023-12-13 ENCOUNTER — NON-APPOINTMENT (OUTPATIENT)
Age: 58
End: 2023-12-13

## 2023-12-26 ENCOUNTER — NON-APPOINTMENT (OUTPATIENT)
Age: 58
End: 2023-12-26

## 2024-01-03 ENCOUNTER — APPOINTMENT (OUTPATIENT)
Dept: ORTHOPEDIC SURGERY | Facility: CLINIC | Age: 59
End: 2024-01-03
Payer: COMMERCIAL

## 2024-01-03 DIAGNOSIS — M19.049 PRIMARY OSTEOARTHRITIS, UNSPECIFIED HAND: ICD-10-CM

## 2024-01-03 PROCEDURE — 99214 OFFICE O/P EST MOD 30 MIN: CPT | Mod: 25

## 2024-01-03 PROCEDURE — 20600 DRAIN/INJ JOINT/BURSA W/O US: CPT | Mod: F2

## 2024-01-03 NOTE — ASSESSMENT
[FreeTextEntry1] : ASSESSMENT: The patient comes in today with chronic exacerbated symptoms of multiple small joint arthritis end-stage which is bothering her tremendously.  Injections have been beneficial however she is considering neck steps.  Today she would like repeat injection.  Surgically she would not like a fusion.   The patient was adequately and thoroughly informed of my assessment of their current condition(s).  - This may diminish bodily function for the extremity. We discussed prognosis, tx modalities including operative and nonoperative options for the above diagnostic assessment. As always, 2nd opinion is always provided as an option.  When accessible, I was able to review other physicians note(s) including reviewing other tests, imaging results as well as personally view these results for my own interpretation.   Injection:  The risks and benefits of a steroid injection were discussed in detail. The risks include but are not limited to: pain, infection, swelling, flare response, bleeding, subcutaneous fat atrophy, skin depigmentation and/or elevation of blood sugar. The risk of incomplete resolution of symptoms, recurrence and additional intervention was reviewed and considered by the patient.  The patient agreed to proceed and under a sterile prep, I injected 1 unit into 1 cc of a combination of Celestone and Lidocaine into the left middle DIP. The patient tolerated the injection well. The patient was adequately and thoroughly informed of my assessment of their current condition(s).  DISCUSSION: 1.  Injection as above.  Follow-up 2 months.  2. [x] 3. [x]

## 2024-01-03 NOTE — HISTORY OF PRESENT ILLNESS
[FreeTextEntry1] : Sarmad is a very pleasant 58-year-old female who presents today with severe left middle finger DIP joint discomfort and swelling.  This has been ongoing for years now and recently getting worse with ADLs

## 2024-01-03 NOTE — PHYSICAL EXAM
[de-identified] : Examination of bilateral hands reveals DIP joint deformity in multiple fingers most pronounced in left middle.  There is significant loss of motion and grinding and crepitus with passive motion [de-identified] : [4] views of [bilateral hands and wrists] were reviewed today in my office and were seen by me and discussed with the patient.  These [show findings consistent with bilateral basal joint OA and findings of IP joint OA]

## 2024-02-13 ENCOUNTER — NON-APPOINTMENT (OUTPATIENT)
Age: 59
End: 2024-02-13

## 2024-04-19 ENCOUNTER — APPOINTMENT (OUTPATIENT)
Dept: ORTHOPEDIC SURGERY | Facility: CLINIC | Age: 59
End: 2024-04-19
Payer: COMMERCIAL

## 2024-04-19 VITALS — WEIGHT: 123 LBS | BODY MASS INDEX: 19.3 KG/M2 | HEIGHT: 67 IN

## 2024-04-19 DIAGNOSIS — M23.90 UNSPECIFIED INTERNAL DERANGEMENT OF UNSPECIFIED KNEE: ICD-10-CM

## 2024-04-19 PROCEDURE — 99203 OFFICE O/P NEW LOW 30 MIN: CPT

## 2024-04-19 PROCEDURE — 73562 X-RAY EXAM OF KNEE 3: CPT | Mod: LT

## 2024-04-19 RX ORDER — DICLOFENAC POTASSIUM 50 MG/1
50 TABLET, COATED ORAL TWICE DAILY
Qty: 14 | Refills: 0 | Status: ACTIVE | COMMUNITY
Start: 2024-04-19 | End: 1900-01-01

## 2024-04-19 RX ORDER — LOSARTAN POTASSIUM 100 MG/1
TABLET, FILM COATED ORAL
Refills: 0 | Status: COMPLETED | COMMUNITY
End: 2024-04-19

## 2024-04-23 ENCOUNTER — APPOINTMENT (OUTPATIENT)
Dept: MRI IMAGING | Facility: CLINIC | Age: 59
End: 2024-04-23
Payer: COMMERCIAL

## 2024-04-23 PROCEDURE — 73721 MRI JNT OF LWR EXTRE W/O DYE: CPT | Mod: LT

## 2024-04-26 ENCOUNTER — APPOINTMENT (OUTPATIENT)
Dept: ORTHOPEDIC SURGERY | Facility: CLINIC | Age: 59
End: 2024-04-26
Payer: COMMERCIAL

## 2024-04-26 VITALS — HEIGHT: 67 IN | WEIGHT: 123 LBS | BODY MASS INDEX: 19.3 KG/M2

## 2024-04-26 PROCEDURE — 99213 OFFICE O/P EST LOW 20 MIN: CPT

## 2024-04-26 NOTE — ASSESSMENT
[FreeTextEntry1] : Patient is stable the anti-inflammatory medicine attempted physical therapy to decrease the swelling increase her quad strength and normalize her gait she will return in 3 to 4 weeks we might consider an arthroscopic procedure to help improve the patellofemoral symptoms.

## 2024-04-26 NOTE — HISTORY OF PRESENT ILLNESS
[Gradual] : gradual [4] : 4 [0] : 0 [Dull/Aching] : dull/aching [Intermittent] : intermittent [Leisure] : leisure [Rest] : rest [Exercising] : exercising [Full time] : Work status: full time [] : no [FreeTextEntry1] : L Knee  [de-identified] : 4/19/2024 [de-identified] : Dr. Melton [de-identified] : MRI [de-identified] :  [employer]

## 2024-04-26 NOTE — REASON FOR VISIT
[FreeTextEntry2] : MRI review L Knee / Patient returns to the office in follow-up regarding her left knee she is improving slowly and her recent MRI scan was significant for some patella subluxation with some degenerative changes on the posterior aspect the patella he does not have any significant tears of the ligaments or meniscus some crepitus on range of motion but no instability.

## 2024-05-17 ENCOUNTER — APPOINTMENT (OUTPATIENT)
Dept: ORTHOPEDIC SURGERY | Facility: CLINIC | Age: 59
End: 2024-05-17

## 2024-05-28 ENCOUNTER — APPOINTMENT (OUTPATIENT)
Dept: ORTHOPEDIC SURGERY | Facility: CLINIC | Age: 59
End: 2024-05-28
Payer: COMMERCIAL

## 2024-05-28 VITALS — WEIGHT: 123 LBS | HEIGHT: 67 IN | BODY MASS INDEX: 19.3 KG/M2

## 2024-05-28 PROCEDURE — 99213 OFFICE O/P EST LOW 20 MIN: CPT

## 2024-05-28 NOTE — HISTORY OF PRESENT ILLNESS
[Gradual] : gradual [4] : 4 [0] : 0 [Dull/Aching] : dull/aching [Intermittent] : intermittent [Leisure] : leisure [Rest] : rest [Exercising] : exercising [Full time] : Work status: full time [] : Post Surgical Visit: no [FreeTextEntry1] : L Knee  [de-identified] : 4/26/24 [de-identified] : Dr. Melton [de-identified] : MRI [de-identified] :  [employer]

## 2024-05-28 NOTE — ASSESSMENT
[FreeTextEntry1] : MRI results discussed with her. Would recommend continue with PT. May try elastic knee brace. Discussed possible HA injections; booklet given.

## 2024-05-28 NOTE — REASON FOR VISIT
[FreeTextEntry2] : Patient seen Dr. Melton previously had MRI.  Was in terrible pain left knee yesterday and used knee brace. She feels better today. Started PT.

## 2024-06-09 NOTE — PHYSICAL EXAM
[Left] : left knee [NL (0)] : extension 0 degrees [5___] : quadriceps 5[unfilled]/5 [] : non-antalgic [TWNoteComboBox7] : flexion 130 degrees no

## 2024-06-24 ENCOUNTER — APPOINTMENT (OUTPATIENT)
Dept: ORTHOPEDIC SURGERY | Facility: CLINIC | Age: 59
End: 2024-06-24
Payer: COMMERCIAL

## 2024-06-24 VITALS — BODY MASS INDEX: 19.3 KG/M2 | WEIGHT: 123 LBS | HEIGHT: 67 IN

## 2024-06-24 DIAGNOSIS — S83.002A UNSPECIFIED SUBLUXATION OF LEFT PATELLA, INITIAL ENCOUNTER: ICD-10-CM

## 2024-06-24 DIAGNOSIS — M17.12 UNILATERAL PRIMARY OSTEOARTHRITIS, LEFT KNEE: ICD-10-CM

## 2024-06-24 PROCEDURE — 99213 OFFICE O/P EST LOW 20 MIN: CPT

## 2024-09-03 ENCOUNTER — NON-APPOINTMENT (OUTPATIENT)
Age: 59
End: 2024-09-03

## 2024-09-06 ENCOUNTER — NON-APPOINTMENT (OUTPATIENT)
Age: 59
End: 2024-09-06

## 2024-10-03 ENCOUNTER — OFFICE (OUTPATIENT)
Dept: URBAN - METROPOLITAN AREA CLINIC 102 | Facility: CLINIC | Age: 59
Setting detail: OPHTHALMOLOGY
End: 2024-10-03
Payer: COMMERCIAL

## 2024-10-03 VITALS — HEIGHT: 55 IN

## 2024-10-03 DIAGNOSIS — H25.13: ICD-10-CM

## 2024-10-03 DIAGNOSIS — G51.4: ICD-10-CM

## 2024-10-03 DIAGNOSIS — D31.31: ICD-10-CM

## 2024-10-03 DIAGNOSIS — H40.033: ICD-10-CM

## 2024-10-03 PROCEDURE — 92020 GONIOSCOPY: CPT | Performed by: OPHTHALMOLOGY

## 2024-10-03 PROCEDURE — 92014 COMPRE OPH EXAM EST PT 1/>: CPT | Performed by: OPHTHALMOLOGY

## 2024-10-03 ASSESSMENT — REFRACTION_CURRENTRX
OS_SPHERE: +0.75
OD_AXIS: 147
OS_OVR_VA: 20/
OD_CYLINDER: -0.50
OD_OVR_VA: 20/
OD_ADD: +1.75
OS_CYLINDER: SPHERE
OD_SPHERE: +1.25
OS_ADD: +1.75

## 2024-10-03 ASSESSMENT — VISUAL ACUITY
OD_BCVA: 20/25
OS_BCVA: 20/20

## 2024-10-03 ASSESSMENT — KERATOMETRY
OS_K1POWER_DIOPTERS: 45.25
OD_AXISANGLE_DEGREES: 083
OS_K2POWER_DIOPTERS: 46.00
OD_K1POWER_DIOPTERS: 45.25
OD_K2POWER_DIOPTERS: 45.75
OS_AXISANGLE_DEGREES: 074
METHOD_AUTO_MANUAL: AUTO

## 2024-10-03 ASSESSMENT — REFRACTION_AUTOREFRACTION
OD_SPHERE: +2.50
OS_AXIS: 160
OD_AXIS: 149
OS_CYLINDER: -0.25
OD_CYLINDER: -0.50
OS_SPHERE: +2.75

## 2024-10-03 ASSESSMENT — REFRACTION_MANIFEST
OD_ADD: +2.25
OD_AXIS: 145
OD_VA1: 20/20
OS_ADD: +2.25
OD_CYLINDER: -0.50
OD_SPHERE: +1.50
OS_SPHERE: +1.25
OS_VA1: 20/20
OS_CYLINDER: SPHERE

## 2024-10-03 ASSESSMENT — TONOMETRY
OD_IOP_MMHG: 11
OS_IOP_MMHG: 11

## 2024-10-03 ASSESSMENT — CONFRONTATIONAL VISUAL FIELD TEST (CVF)
OS_FINDINGS: FULL
OD_FINDINGS: FULL

## 2024-11-14 ENCOUNTER — NON-APPOINTMENT (OUTPATIENT)
Age: 59
End: 2024-11-14

## 2024-11-26 ENCOUNTER — NON-APPOINTMENT (OUTPATIENT)
Age: 59
End: 2024-11-26

## 2025-01-22 ENCOUNTER — APPOINTMENT (OUTPATIENT)
Dept: ORTHOPEDIC SURGERY | Facility: CLINIC | Age: 60
End: 2025-01-22
Payer: COMMERCIAL

## 2025-01-22 VITALS — BODY MASS INDEX: 19.3 KG/M2 | WEIGHT: 123 LBS | HEIGHT: 67 IN

## 2025-01-22 DIAGNOSIS — M75.22 BICIPITAL TENDINITIS, LEFT SHOULDER: ICD-10-CM

## 2025-01-22 DIAGNOSIS — M75.82 OTHER SHOULDER LESIONS, LEFT SHOULDER: ICD-10-CM

## 2025-01-22 PROCEDURE — 99214 OFFICE O/P EST MOD 30 MIN: CPT | Mod: 25

## 2025-01-22 PROCEDURE — 99213 OFFICE O/P EST LOW 20 MIN: CPT | Mod: 25

## 2025-01-22 PROCEDURE — 73030 X-RAY EXAM OF SHOULDER: CPT | Mod: LT

## 2025-01-22 RX ORDER — DICLOFENAC SODIUM 75 MG/1
75 TABLET, DELAYED RELEASE ORAL
Qty: 60 | Refills: 2 | Status: ACTIVE | COMMUNITY
Start: 2025-01-22 | End: 1900-01-01

## 2025-02-07 ENCOUNTER — NON-APPOINTMENT (OUTPATIENT)
Age: 60
End: 2025-02-07

## 2025-02-12 ENCOUNTER — APPOINTMENT (OUTPATIENT)
Dept: ORTHOPEDIC SURGERY | Facility: CLINIC | Age: 60
End: 2025-02-12

## 2025-07-04 ENCOUNTER — NON-APPOINTMENT (OUTPATIENT)
Age: 60
End: 2025-07-04